# Patient Record
Sex: MALE | Race: BLACK OR AFRICAN AMERICAN | NOT HISPANIC OR LATINO | Employment: FULL TIME | URBAN - METROPOLITAN AREA
[De-identification: names, ages, dates, MRNs, and addresses within clinical notes are randomized per-mention and may not be internally consistent; named-entity substitution may affect disease eponyms.]

---
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
## 2024-11-27 ENCOUNTER — OFFICE VISIT (OUTPATIENT)
Dept: FAMILY MEDICINE | Facility: CLINIC | Age: 24
End: 2024-11-27

## 2024-11-27 DIAGNOSIS — Z91.199 NO-SHOW FOR APPOINTMENT: Primary | ICD-10-CM

## 2025-06-10 PROCEDURE — 99285 EMERGENCY DEPT VISIT HI MDM: CPT | Mod: 25

## 2025-06-10 RX ORDER — HYDROCODONE BITARTRATE AND ACETAMINOPHEN 5; 325 MG/1; MG/1
1 TABLET ORAL ONCE
Refills: 0 | Status: COMPLETED | OUTPATIENT
Start: 2025-06-10 | End: 2025-06-11

## 2025-06-10 ASSESSMENT — COLUMBIA-SUICIDE SEVERITY RATING SCALE - C-SSRS
1. IN THE PAST MONTH, HAVE YOU WISHED YOU WERE DEAD OR WISHED YOU COULD GO TO SLEEP AND NOT WAKE UP?: NO
6. HAVE YOU EVER DONE ANYTHING, STARTED TO DO ANYTHING, OR PREPARED TO DO ANYTHING TO END YOUR LIFE?: NO
2. HAVE YOU ACTUALLY HAD ANY THOUGHTS OF KILLING YOURSELF IN THE PAST MONTH?: NO

## 2025-06-11 ENCOUNTER — HOSPITAL ENCOUNTER (EMERGENCY)
Facility: CLINIC | Age: 25
Discharge: HOME | End: 2025-06-11
Attending: EMERGENCY MEDICINE

## 2025-06-11 ENCOUNTER — APPOINTMENT (OUTPATIENT)
Dept: CT IMAGING | Facility: CLINIC | Age: 25
End: 2025-06-11
Attending: EMERGENCY MEDICINE

## 2025-06-11 ENCOUNTER — APPOINTMENT (OUTPATIENT)
Dept: GENERAL RADIOLOGY | Facility: CLINIC | Age: 25
End: 2025-06-11
Attending: EMERGENCY MEDICINE

## 2025-06-11 VITALS
RESPIRATION RATE: 24 BRPM | OXYGEN SATURATION: 96 % | HEART RATE: 103 BPM | SYSTOLIC BLOOD PRESSURE: 125 MMHG | TEMPERATURE: 97.1 F | DIASTOLIC BLOOD PRESSURE: 69 MMHG

## 2025-06-11 DIAGNOSIS — S83.91XA SPRAIN OF RIGHT KNEE, UNSPECIFIED LIGAMENT, INITIAL ENCOUNTER: ICD-10-CM

## 2025-06-11 LAB
ANION GAP SERPL CALCULATED.3IONS-SCNC: 18 MMOL/L (ref 7–15)
BASOPHILS # BLD AUTO: 0 10E3/UL (ref 0–0.2)
BASOPHILS NFR BLD AUTO: 0 %
BUN SERPL-MCNC: 14.8 MG/DL (ref 6–20)
CALCIUM SERPL-MCNC: 10.6 MG/DL (ref 8.8–10.4)
CHLORIDE SERPL-SCNC: 101 MMOL/L (ref 98–107)
CREAT SERPL-MCNC: 1.29 MG/DL (ref 0.67–1.17)
EGFRCR SERPLBLD CKD-EPI 2021: 80 ML/MIN/1.73M2
EOSINOPHIL # BLD AUTO: 0 10E3/UL (ref 0–0.7)
EOSINOPHIL NFR BLD AUTO: 0 %
ERYTHROCYTE [DISTWIDTH] IN BLOOD BY AUTOMATED COUNT: 12 % (ref 10–15)
GLUCOSE SERPL-MCNC: 127 MG/DL (ref 70–99)
HCO3 SERPL-SCNC: 21 MMOL/L (ref 22–29)
HCT VFR BLD AUTO: 41.9 % (ref 40–53)
HGB BLD-MCNC: 14.7 G/DL (ref 13.3–17.7)
IMM GRANULOCYTES # BLD: 0.1 10E3/UL
IMM GRANULOCYTES NFR BLD: 0 %
LYMPHOCYTES # BLD AUTO: 1.2 10E3/UL (ref 0.8–5.3)
LYMPHOCYTES NFR BLD AUTO: 8 %
MCH RBC QN AUTO: 32.7 PG (ref 26.5–33)
MCHC RBC AUTO-ENTMCNC: 35.1 G/DL (ref 31.5–36.5)
MCV RBC AUTO: 93 FL (ref 78–100)
MONOCYTES # BLD AUTO: 1.3 10E3/UL (ref 0–1.3)
MONOCYTES NFR BLD AUTO: 8 %
NEUTROPHILS # BLD AUTO: 13.4 10E3/UL (ref 1.6–8.3)
NEUTROPHILS NFR BLD AUTO: 83 %
NRBC # BLD AUTO: 0 10E3/UL
NRBC BLD AUTO-RTO: 0 /100
PLATELET # BLD AUTO: 364 10E3/UL (ref 150–450)
POTASSIUM SERPL-SCNC: 3.3 MMOL/L (ref 3.4–5.3)
RBC # BLD AUTO: 4.49 10E6/UL (ref 4.4–5.9)
SODIUM SERPL-SCNC: 140 MMOL/L (ref 135–145)
WBC # BLD AUTO: 16 10E3/UL (ref 4–11)

## 2025-06-11 PROCEDURE — 73590 X-RAY EXAM OF LOWER LEG: CPT | Mod: RT

## 2025-06-11 PROCEDURE — 96375 TX/PRO/DX INJ NEW DRUG ADDON: CPT

## 2025-06-11 PROCEDURE — 250N000013 HC RX MED GY IP 250 OP 250 PS 637: Performed by: EMERGENCY MEDICINE

## 2025-06-11 PROCEDURE — 96374 THER/PROPH/DIAG INJ IV PUSH: CPT

## 2025-06-11 PROCEDURE — 96376 TX/PRO/DX INJ SAME DRUG ADON: CPT

## 2025-06-11 PROCEDURE — 80048 BASIC METABOLIC PNL TOTAL CA: CPT | Performed by: EMERGENCY MEDICINE

## 2025-06-11 PROCEDURE — 73706 CT ANGIO LWR EXTR W/O&W/DYE: CPT | Mod: RT

## 2025-06-11 PROCEDURE — 250N000011 HC RX IP 250 OP 636: Performed by: EMERGENCY MEDICINE

## 2025-06-11 PROCEDURE — 250N000011 HC RX IP 250 OP 636: Mod: JZ | Performed by: EMERGENCY MEDICINE

## 2025-06-11 PROCEDURE — 73560 X-RAY EXAM OF KNEE 1 OR 2: CPT | Mod: RT

## 2025-06-11 PROCEDURE — 73700 CT LOWER EXTREMITY W/O DYE: CPT | Mod: RT

## 2025-06-11 PROCEDURE — 250N000009 HC RX 250: Performed by: EMERGENCY MEDICINE

## 2025-06-11 PROCEDURE — 85025 COMPLETE CBC W/AUTO DIFF WBC: CPT | Performed by: EMERGENCY MEDICINE

## 2025-06-11 PROCEDURE — 36415 COLL VENOUS BLD VENIPUNCTURE: CPT | Performed by: EMERGENCY MEDICINE

## 2025-06-11 RX ORDER — MORPHINE SULFATE 4 MG/ML
4 INJECTION, SOLUTION INTRAMUSCULAR; INTRAVENOUS ONCE
Refills: 0 | Status: COMPLETED | OUTPATIENT
Start: 2025-06-11 | End: 2025-06-11

## 2025-06-11 RX ORDER — IOPAMIDOL 755 MG/ML
500 INJECTION, SOLUTION INTRAVASCULAR ONCE
Status: COMPLETED | OUTPATIENT
Start: 2025-06-11 | End: 2025-06-11

## 2025-06-11 RX ORDER — NAPROXEN 500 MG/1
500 TABLET ORAL 2 TIMES DAILY WITH MEALS
Qty: 24 TABLET | Refills: 0 | Status: SHIPPED | OUTPATIENT
Start: 2025-06-11 | End: 2025-06-23

## 2025-06-11 RX ORDER — MORPHINE SULFATE 4 MG/ML
4 INJECTION, SOLUTION INTRAMUSCULAR; INTRAVENOUS
Refills: 0 | Status: COMPLETED | OUTPATIENT
Start: 2025-06-11 | End: 2025-06-11

## 2025-06-11 RX ORDER — HYDROCODONE BITARTRATE AND ACETAMINOPHEN 5; 325 MG/1; MG/1
1 TABLET ORAL EVERY 6 HOURS PRN
Qty: 20 TABLET | Refills: 0 | Status: SHIPPED | OUTPATIENT
Start: 2025-06-11 | End: 2025-06-16

## 2025-06-11 RX ORDER — KETOROLAC TROMETHAMINE 15 MG/ML
10 INJECTION, SOLUTION INTRAMUSCULAR; INTRAVENOUS ONCE
Status: COMPLETED | OUTPATIENT
Start: 2025-06-11 | End: 2025-06-11

## 2025-06-11 RX ADMIN — KETOROLAC TROMETHAMINE 10 MG: 15 INJECTION, SOLUTION INTRAMUSCULAR; INTRAVENOUS at 03:22

## 2025-06-11 RX ADMIN — IOPAMIDOL 90 ML: 755 INJECTION, SOLUTION INTRAVENOUS at 03:11

## 2025-06-11 RX ADMIN — MORPHINE SULFATE 4 MG: 4 INJECTION, SOLUTION INTRAMUSCULAR; INTRAVENOUS at 01:23

## 2025-06-11 RX ADMIN — MORPHINE SULFATE 4 MG: 4 INJECTION INTRAVENOUS at 03:44

## 2025-06-11 RX ADMIN — HYDROCODONE BITARTRATE AND ACETAMINOPHEN 1 TABLET: 5; 325 TABLET ORAL at 03:21

## 2025-06-11 RX ADMIN — SODIUM CHLORIDE 80 ML: 9 INJECTION, SOLUTION INTRAVENOUS at 03:11

## 2025-06-11 ASSESSMENT — ACTIVITIES OF DAILY LIVING (ADL)
ADLS_ACUITY_SCORE: 41

## 2025-06-11 NOTE — DISCHARGE INSTRUCTIONS
Return to the ER for worsening pain, numbness or weakness of the foot, or any new concerns.    You should keep your leg elevated is much as possible.  Use your knee immobilizer and crutches until you are seen in the orthopedic clinic.  Call this morning to make an appointment.    Do not drive, use machinery, use alcohol, use of the sedating medications, or use any medication that also contains acetaminophen (Tylenol) while taking Norco.

## 2025-06-11 NOTE — ED TRIAGE NOTES
Patient arrives complaining of right knee/leg pain. Fell playing basketball. Pt is restless in pain.

## 2025-06-11 NOTE — ED PROVIDER NOTES
Emergency Department Note      History of Present Illness     Chief Complaint   Knee Pain      HPI   Peña House is a 24 year old male who presents to the ED with an injury to the right knee.  He says he was playing basketball and landed on the right leg.  Initially had pain of the kneecap.  He is not aware of any displacement of the patella or the knee joint.  Initially his pain was manageable but became severe 2 or 3 hours later.  He is now unable to walk on the knee without significant discomfort.  No prior injuries to the knee.  He denies a head or neck injury.  No pain to the hip or ankle.  No numbness or weakness of the foot.    Independent Historian   2 friends are present to give additional history regards to the mechanism of injury and the progression of his pain.    Past Medical History     Medical History and Problem List   No past medical history on file.    Medications   albuterol (2.5 MG/3ML) 0.083% nebulizer solution  albuterol (ALBUTEROL) 108 (90 BASE) MCG/ACT inhaler  HYDROcodone-acetaminophen (NORCO) 5-325 MG tablet  naproxen (NAPROSYN) 500 MG tablet        Surgical History   No past surgical history on file.    Physical Exam     Patient Vitals for the past 24 hrs:   BP Temp Pulse Resp SpO2   06/11/25 0348 -- -- -- -- 96 %   06/11/25 0347 -- -- -- -- 97 %   06/11/25 0346 -- -- -- -- 98 %   06/11/25 0345 -- -- -- -- 98 %   06/11/25 0344 125/69 -- 103 -- 98 %   06/11/25 0328 -- -- -- -- 98 %   06/11/25 0327 -- -- -- -- 97 %   06/11/25 0326 -- -- -- -- 97 %   06/11/25 0325 -- -- -- -- 99 %   06/11/25 0323 -- -- -- -- 99 %   06/11/25 0322 -- -- -- -- 97 %   06/11/25 0321 -- -- -- -- 99 %   06/10/25 2349 -- 97.1  F (36.2  C) -- -- --   06/10/25 2348 (!) 133/93 -- 85 24 99 %     Physical Exam  Constitutional:       General: He is not in acute distress.     Appearance: Normal appearance. He is not toxic-appearing.   HENT:      Head: Atraumatic.   Eyes:      General: No scleral icterus.      Conjunctiva/sclera: Conjunctivae normal.   Cardiovascular:      Rate and Rhythm: Normal rate and regular rhythm.      Heart sounds: Normal heart sounds.      Comments: Dorsalis pedis and posterior tibial pulses to the right foot 2+ and symmetric to the left.  No cyanosis or pallor of the foot.  Pulmonary:      Effort: Pulmonary effort is normal. No respiratory distress.      Breath sounds: Normal breath sounds.   Abdominal:      Palpations: Abdomen is soft.      Tenderness: There is no abdominal tenderness.   Musculoskeletal:         General: No deformity.      Cervical back: Neck supple.      Comments: Tenderness with swelling of the patella and medial joint line of the right knee.  The patient resists range of motion.  No tenderness of the ankle or hip.   Skin:     General: Skin is warm.   Neurological:      Mental Status: He is alert.      Comments: Strength and sensation to light touch intact to the right foot.   Psychiatric:      Comments: Appears anxious           Diagnostics     Lab Results   Labs Ordered and Resulted from Time of ED Arrival to Time of ED Departure   BASIC METABOLIC PANEL - Abnormal       Result Value    Sodium 140      Potassium 3.3 (*)     Chloride 101      Carbon Dioxide (CO2) 21 (*)     Anion Gap 18 (*)     Urea Nitrogen 14.8      Creatinine 1.29 (*)     GFR Estimate 80      Calcium 10.6 (*)     Glucose 127 (*)    CBC WITH PLATELETS AND DIFFERENTIAL - Abnormal    WBC Count 16.0 (*)     RBC Count 4.49      Hemoglobin 14.7      Hematocrit 41.9      MCV 93      MCH 32.7      MCHC 35.1      RDW 12.0      Platelet Count 364      % Neutrophils 83      % Lymphocytes 8      % Monocytes 8      % Eosinophils 0      % Basophils 0      % Immature Granulocytes 0      NRBCs per 100 WBC 0      Absolute Neutrophils 13.4 (*)     Absolute Lymphocytes 1.2      Absolute Monocytes 1.3      Absolute Eosinophils 0.0      Absolute Basophils 0.0      Absolute Immature Granulocytes 0.1      Absolute NRBCs 0.0          Imaging   CTA Lower Extremity Right with Contrast   Preliminary Result   IMPRESSION: Normal right lower extremity CT angiogram. Specifically, no hemodynamically significant stenosis or ongoing active extravasation.      CT Knee Right w/o Contrast   Final Result   IMPRESSION:   1.  Anatomic alignment of the right knee. No fracture or dislocation. Specifically, patellofemoral alignment is maintained.      2.  Tiny suprapatellar effusion.         XR Tibia & Fibula Port Right 2 Views   Final Result   IMPRESSION: No acute, displaced right knee fracture. The patella projects over the lateral right femoral condyle on the AP view, which may be the result of rotated positioning. Patellofemoral alignment cannot be confirmed in absence of sunrise view. No    significant knee joint effusion. No displaced tibia or fibula fracture is demonstrated, though rotated positioning compromises visualization of the distal fibula.      XR Knee Port Right 1/2 Views   Final Result   IMPRESSION: No acute, displaced right knee fracture. The patella projects over the lateral right femoral condyle on the AP view, which may be the result of rotated positioning. Patellofemoral alignment cannot be confirmed in absence of sunrise view. No    significant knee joint effusion. No displaced tibia or fibula fracture is demonstrated, though rotated positioning compromises visualization of the distal fibula.          Independent Interpretation   X-ray of the right knee independently interpreted.  No fracture.    ED Course      Medications Administered   Medications   HYDROcodone-acetaminophen (NORCO) 5-325 MG per tablet 1 tablet (1 tablet Oral $Given 6/11/25 0321)   morphine (PF) injection 4 mg (4 mg Intravenous $Given 6/11/25 0123)   morphine (PF) injection 4 mg (4 mg Intravenous $Given 6/11/25 0344)   ketorolac (TORADOL) injection 10 mg (10 mg Intravenous $Given 6/11/25 0322)   iopamidol (ISOVUE-370) solution 500 mL (90 mLs Intravenous $Given  6/11/25 0311)   sodium chloride 0.9 % bag for CT scan flush (80 mLs Intravenous $Given 6/11/25 0311)       Medical Decision Making / Diagnosis     ANGELA House is a 24 year old male who presents to the ED with an injury to the right knee.  He twisted and fell onto his knee playing basketball.  He initially was able to walk for 2 or 3 hours but then his pain became worse.  On arrival here he was quite distressed and was screaming in pain and holding his knee.  X-rays need to be done portably.  There is no clear fracture but were not able to see the patellofemoral alignment.  Given his complaint of pain he had a CT scan that did not show fracture and patellofemoral alignment is normal.  He does have some fluid adjacent to the patella likely indicating a ligamentous injury.  Given his complaint of pain he had a CT angiogram and fortunately there is no vascular injury.    Hemodynamically the patient is stable.  No neurovascular deficit distally.  His pain is now adequately controlled and is resting comfortably.  He was given knee immobilizer and crutches.    We discussed that he will need to follow-up with orthopedics in the short-term.  He will call for an appointment tomorrow.  He likely will need an MRI if he continues to have ongoing pain.    Disposition   The patient was discharged.     Diagnosis     ICD-10-CM    1. Sprain of right knee, unspecified ligament, initial encounter  S83.91XA            Discharge Medications   New Prescriptions    HYDROCODONE-ACETAMINOPHEN (NORCO) 5-325 MG TABLET    Take 1 tablet by mouth every 6 hours as needed.    NAPROXEN (NAPROSYN) 500 MG TABLET    Take 1 tablet (500 mg) by mouth 2 times daily (with meals) for 12 days.               Ellis Reyna MD  06/11/25 0359

## 2025-06-11 NOTE — ED NOTES
Patient yelling at xray staff, refusing to sit for an xray. Pt out to the lobby, continues to yell in pain and be verbally abusive.